# Patient Record
Sex: FEMALE | URBAN - METROPOLITAN AREA
[De-identification: names, ages, dates, MRNs, and addresses within clinical notes are randomized per-mention and may not be internally consistent; named-entity substitution may affect disease eponyms.]

---

## 2020-08-02 ENCOUNTER — EMERGENCY (EMERGENCY)
Facility: HOSPITAL | Age: 20
LOS: 0 days | Discharge: HOME | End: 2020-08-02
Attending: EMERGENCY MEDICINE
Payer: COMMERCIAL

## 2020-08-02 VITALS
RESPIRATION RATE: 16 BRPM | SYSTOLIC BLOOD PRESSURE: 122 MMHG | HEART RATE: 67 BPM | DIASTOLIC BLOOD PRESSURE: 67 MMHG | OXYGEN SATURATION: 100 % | TEMPERATURE: 98 F

## 2020-08-02 VITALS — WEIGHT: 190.92 LBS

## 2020-08-02 DIAGNOSIS — S32.019A UNSPECIFIED FRACTURE OF FIRST LUMBAR VERTEBRA, INITIAL ENCOUNTER FOR CLOSED FRACTURE: ICD-10-CM
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
## 2020-08-02 DIAGNOSIS — M54.5 LOW BACK PAIN: ICD-10-CM

## 2020-08-02 DIAGNOSIS — Z97.5 PRESENCE OF (INTRAUTERINE) CONTRACEPTIVE DEVICE: ICD-10-CM
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
## 2020-08-02 LAB
ABO RH CONFIRMATION: SIGNIFICANT CHANGE UP
ALBUMIN SERPL ELPH-MCNC: 3.9 G/DL — SIGNIFICANT CHANGE UP (ref 3.3–5)
ALP SERPL-CCNC: 69 U/L — SIGNIFICANT CHANGE UP (ref 40–120)
ALT FLD-CCNC: 17 U/L — SIGNIFICANT CHANGE UP (ref 12–78)
ANION GAP SERPL CALC-SCNC: 6 MMOL/L — SIGNIFICANT CHANGE UP (ref 5–17)
APTT BLD: 32.1 SEC — SIGNIFICANT CHANGE UP (ref 27.5–35.5)
AST SERPL-CCNC: 18 U/L — SIGNIFICANT CHANGE UP (ref 15–37)
BASOPHILS # BLD AUTO: 0.03 K/UL — SIGNIFICANT CHANGE UP (ref 0–0.2)
BASOPHILS NFR BLD AUTO: 0.2 % — SIGNIFICANT CHANGE UP (ref 0–2)
BILIRUB SERPL-MCNC: 0.3 MG/DL — SIGNIFICANT CHANGE UP (ref 0.2–1.2)
BUN SERPL-MCNC: 9 MG/DL — SIGNIFICANT CHANGE UP (ref 7–23)
CALCIUM SERPL-MCNC: 8.6 MG/DL — SIGNIFICANT CHANGE UP (ref 8.5–10.1)
CHLORIDE SERPL-SCNC: 110 MMOL/L — HIGH (ref 96–108)
CO2 SERPL-SCNC: 24 MMOL/L — SIGNIFICANT CHANGE UP (ref 22–31)
CREAT SERPL-MCNC: 0.73 MG/DL — SIGNIFICANT CHANGE UP (ref 0.5–1.3)
EOSINOPHIL # BLD AUTO: 0.02 K/UL — SIGNIFICANT CHANGE UP (ref 0–0.5)
EOSINOPHIL NFR BLD AUTO: 0.2 % — SIGNIFICANT CHANGE UP (ref 0–6)
GLUCOSE SERPL-MCNC: 102 MG/DL — HIGH (ref 70–99)
HCG SERPL-ACNC: <1 MIU/ML — SIGNIFICANT CHANGE UP
HCT VFR BLD CALC: 38 % — SIGNIFICANT CHANGE UP (ref 34.5–45)
HGB BLD-MCNC: 12.4 G/DL — SIGNIFICANT CHANGE UP (ref 11.5–15.5)
IMM GRANULOCYTES NFR BLD AUTO: 0.9 % — SIGNIFICANT CHANGE UP (ref 0–1.5)
INR BLD: 1.07 RATIO — SIGNIFICANT CHANGE UP (ref 0.88–1.16)
LYMPHOCYTES # BLD AUTO: 1.29 K/UL — SIGNIFICANT CHANGE UP (ref 1–3.3)
LYMPHOCYTES # BLD AUTO: 9.9 % — LOW (ref 13–44)
MCHC RBC-ENTMCNC: 27.3 PG — SIGNIFICANT CHANGE UP (ref 27–34)
MCHC RBC-ENTMCNC: 32.6 GM/DL — SIGNIFICANT CHANGE UP (ref 32–36)
MCV RBC AUTO: 83.7 FL — SIGNIFICANT CHANGE UP (ref 80–100)
MONOCYTES # BLD AUTO: 0.53 K/UL — SIGNIFICANT CHANGE UP (ref 0–0.9)
MONOCYTES NFR BLD AUTO: 4.1 % — SIGNIFICANT CHANGE UP (ref 2–14)
NEUTROPHILS # BLD AUTO: 11.04 K/UL — HIGH (ref 1.8–7.4)
NEUTROPHILS NFR BLD AUTO: 84.7 % — HIGH (ref 43–77)
PLATELET # BLD AUTO: 358 K/UL — SIGNIFICANT CHANGE UP (ref 150–400)
POTASSIUM SERPL-MCNC: 3.9 MMOL/L — SIGNIFICANT CHANGE UP (ref 3.5–5.3)
POTASSIUM SERPL-SCNC: 3.9 MMOL/L — SIGNIFICANT CHANGE UP (ref 3.5–5.3)
PROT SERPL-MCNC: 7.4 GM/DL — SIGNIFICANT CHANGE UP (ref 6–8.3)
PROTHROM AB SERPL-ACNC: 12.5 SEC — SIGNIFICANT CHANGE UP (ref 10.6–13.6)
RBC # BLD: 4.54 M/UL — SIGNIFICANT CHANGE UP (ref 3.8–5.2)
RBC # FLD: 14.1 % — SIGNIFICANT CHANGE UP (ref 10.3–14.5)
SODIUM SERPL-SCNC: 140 MMOL/L — SIGNIFICANT CHANGE UP (ref 135–145)
WBC # BLD: 13.03 K/UL — HIGH (ref 3.8–10.5)
WBC # FLD AUTO: 13.03 K/UL — HIGH (ref 3.8–10.5)

## 2020-08-02 PROCEDURE — 81025 URINE PREGNANCY TEST: CPT

## 2020-08-02 PROCEDURE — 74177 CT ABD & PELVIS W/CONTRAST: CPT

## 2020-08-02 PROCEDURE — 86850 RBC ANTIBODY SCREEN: CPT

## 2020-08-02 PROCEDURE — 85025 COMPLETE CBC W/AUTO DIFF WBC: CPT

## 2020-08-02 PROCEDURE — 86905 BLOOD TYPING RBC ANTIGENS: CPT

## 2020-08-02 PROCEDURE — 72170 X-RAY EXAM OF PELVIS: CPT

## 2020-08-02 PROCEDURE — 96374 THER/PROPH/DIAG INJ IV PUSH: CPT

## 2020-08-02 PROCEDURE — 86077 PHYS BLOOD BANK SERV XMATCH: CPT

## 2020-08-02 PROCEDURE — 80053 COMPREHEN METABOLIC PANEL: CPT

## 2020-08-02 PROCEDURE — 72220 X-RAY EXAM SACRUM TAILBONE: CPT | Mod: 26

## 2020-08-02 PROCEDURE — 72170 X-RAY EXAM OF PELVIS: CPT | Mod: 26

## 2020-08-02 PROCEDURE — 72220 X-RAY EXAM SACRUM TAILBONE: CPT

## 2020-08-02 PROCEDURE — 99284 EMERGENCY DEPT VISIT MOD MDM: CPT

## 2020-08-02 PROCEDURE — 74177 CT ABD & PELVIS W/CONTRAST: CPT | Mod: 26

## 2020-08-02 PROCEDURE — 99284 EMERGENCY DEPT VISIT MOD MDM: CPT | Mod: 25

## 2020-08-02 PROCEDURE — 86880 COOMBS TEST DIRECT: CPT

## 2020-08-02 PROCEDURE — 85610 PROTHROMBIN TIME: CPT

## 2020-08-02 PROCEDURE — 36415 COLL VENOUS BLD VENIPUNCTURE: CPT

## 2020-08-02 PROCEDURE — 86870 RBC ANTIBODY IDENTIFICATION: CPT

## 2020-08-02 PROCEDURE — 86900 BLOOD TYPING SEROLOGIC ABO: CPT

## 2020-08-02 PROCEDURE — 96361 HYDRATE IV INFUSION ADD-ON: CPT

## 2020-08-02 PROCEDURE — 86901 BLOOD TYPING SEROLOGIC RH(D): CPT

## 2020-08-02 PROCEDURE — 84702 CHORIONIC GONADOTROPIN TEST: CPT

## 2020-08-02 PROCEDURE — 85730 THROMBOPLASTIN TIME PARTIAL: CPT

## 2020-08-02 RX ORDER — SODIUM CHLORIDE 9 MG/ML
1000 INJECTION INTRAMUSCULAR; INTRAVENOUS; SUBCUTANEOUS ONCE
Refills: 0 | Status: COMPLETED | OUTPATIENT
Start: 2020-08-02 | End: 2020-08-02

## 2020-08-02 RX ORDER — OXYCODONE AND ACETAMINOPHEN 5; 325 MG/1; MG/1
1 TABLET ORAL ONCE
Refills: 0 | Status: DISCONTINUED | OUTPATIENT
Start: 2020-08-02 | End: 2020-08-02

## 2020-08-02 RX ORDER — ONDANSETRON 8 MG/1
1 TABLET, FILM COATED ORAL
Qty: 12 | Refills: 0
Start: 2020-08-02

## 2020-08-02 RX ORDER — HYDROMORPHONE HYDROCHLORIDE 2 MG/ML
0.5 INJECTION INTRAMUSCULAR; INTRAVENOUS; SUBCUTANEOUS ONCE
Refills: 0 | Status: DISCONTINUED | OUTPATIENT
Start: 2020-08-02 | End: 2020-08-02

## 2020-08-02 RX ORDER — IBUPROFEN 200 MG
600 TABLET ORAL ONCE
Refills: 0 | Status: COMPLETED | OUTPATIENT
Start: 2020-08-02 | End: 2020-08-02

## 2020-08-02 RX ORDER — ONDANSETRON 8 MG/1
4 TABLET, FILM COATED ORAL ONCE
Refills: 0 | Status: COMPLETED | OUTPATIENT
Start: 2020-08-02 | End: 2020-08-02

## 2020-08-02 RX ADMIN — OXYCODONE AND ACETAMINOPHEN 1 TABLET(S): 5; 325 TABLET ORAL at 16:09

## 2020-08-02 RX ADMIN — ONDANSETRON 4 MILLIGRAM(S): 8 TABLET, FILM COATED ORAL at 12:59

## 2020-08-02 RX ADMIN — SODIUM CHLORIDE 1000 MILLILITER(S): 9 INJECTION INTRAMUSCULAR; INTRAVENOUS; SUBCUTANEOUS at 13:28

## 2020-08-02 RX ADMIN — HYDROMORPHONE HYDROCHLORIDE 0.5 MILLIGRAM(S): 2 INJECTION INTRAMUSCULAR; INTRAVENOUS; SUBCUTANEOUS at 13:28

## 2020-08-02 RX ADMIN — Medication 600 MILLIGRAM(S): at 12:26

## 2020-08-02 RX ADMIN — Medication 600 MILLIGRAM(S): at 12:50

## 2020-08-02 RX ADMIN — SODIUM CHLORIDE 1000 MILLILITER(S): 9 INJECTION INTRAMUSCULAR; INTRAVENOUS; SUBCUTANEOUS at 16:07

## 2020-08-02 NOTE — ED STATDOCS - PATIENT PORTAL LINK FT
You can access the FollowMyHealth Patient Portal offered by [facility] by registering at the following website: [email or web address]. By joining "[employer]’s FollowMyHealth portal, you will also be able to view your health information using other applications (apps) compatible with our system.

## 2020-08-02 NOTE — ED STATDOCS - NSFOLLOWUPINSTRUCTIONS_ED_ALL_ED_FT
.dccompression Spinal Compression Fracture     A spinal compression fracture is a collapse of the bones that form the spine (vertebrae). With this type of fracture, the vertebrae become pushed (compressed) into a wedge shape. Most compression fractures happen in the middle or lower part of the spine.  What are the causes?  This condition may be caused by:  Thinning and loss of density in the bones (osteoporosis). This is the most common cause.A fall.A car or motorcycle accident.Cancer.Trauma, such as a heavy, direct hit to the head or back.What increases the risk?  You are more likely to develop this condition if:  You are 60 years or older.You have osteoporosis.You have certain types of cancer, including:  Multiple myeloma.Lymphoma.Prostate cancer.Lung cancer.Breast cancer.What are the signs or symptoms?  Symptoms of this condition include:  Severe pain.Pain that gets worse over time.Pain that is worse when you stand, walk, sit, or bend.Sudden pain that is so bad that it is hard for you to move.Bending or humping of the spine.Gradual loss of height.Numbness, tingling, or weakness in the back and legs.Trouble walking.Your symptoms will depend on the cause of the fracture and how quickly it develops.  How is this diagnosed?  This condition may be diagnosed based on symptoms, medical history, and a physical exam. During the physical exam, your health care provider may tap along the length of your spine to check for tenderness. Tests may be done to confirm the diagnosis. They may include:  A bone mineral density test to check for osteoporosis.Imaging tests, such as a spine X-ray, CT scan, or MRI.How is this treated?  Treatment for this condition depends on the cause and severity of the condition. Some fractures may heal on their own with supportive care. Treatment may include:  Pain medicine.Rest.A back brace.Physical therapy exercises.Medicine to strengthen bone.Calcium and vitamin D supplements.Fractures that cause the back to become misshapen, cause nerve pain or weakness, or do not respond to other treatment may be treated with surgery. This may include:  Vertebroplasty. Bone cement is injected into the collapsed vertebrae to stabilize them.Balloon kyphoplasty. The collapsed vertebrae are expanded with a balloon and then bone cement is injected into them.Spinal fusion. The collapsed vertebrae are connected (fused) to normal vertebrae.Follow these instructions at home:  Medicines     Take over-the-counter and prescription medicines only as told by your health care provider.Do not drive or operate heavy machinery while taking prescription pain medicine.If you are taking prescription pain medicine, take actions to prevent or treat constipation. Your health care provider may recommend that you:   Drink enough fluid to keep your urine pale yellow. Eat foods that are high in fiber, such as fresh fruits and vegetables, whole grains, and beans. Limit foods that are high in fat and processed sugars, such as fried or sweet foods. Take an over-the-counter or prescription medicine for constipation.If you have a brace:     Wear the brace as told by your health care provider. Remove it only as told by your health care provider. Loosen the brace if your fingers or toes tingle, become numb, or turn cold and blue.Keep the brace clean.If the brace is not waterproof:  Do not let it get wet.Cover it with a watertight covering when you take a bath or a shower.Managing pain, stiffness, and swelling        If directed, apply ice to the injured area:  If you have a removable brace, remove it as told by your health care provider.Put ice in a plastic bag.Place a towel between your skin and the bag.Leave the ice on for 30 minutes every two hours at first. Then apply the ice as needed.Activity     Rest as told by your health care provider.   Avoid sitting for a long time without moving. Get up to take short walks every 1–2 hours. This is important to improve blood flow and breathing. Ask for help if you feel weak or unsteady.Return to your normal activities as directed by your health care provider. Ask what activities are safe for you.Do exercises to improve motion and strength in your back (physical therapy), as recommended by your health care provider.Exercise regularly as directed by your health care provider.General instructions        Do not drink alcohol. Alcohol can interfere with your treatment.Do not use any products that contain nicotine or tobacco, such as cigarettes and e-cigarettes. These can delay bone healing. If you need help quitting, ask your health care provider.Keep all follow-up visits as told by your health care provider. This is important. It can help to prevent permanent injury, disability, and long-lasting (chronic) pain.Contact a health care provider if:  You have a fever.You develop a cough that makes your pain worse.Your pain medicine is not helping.Your pain does not get better over time.You cannot return to your normal activities as planned or expected.Get help right away if:  Your pain is very bad and it suddenly gets worse.You are unable to move any body part (paralysis) that is below the level of your injury.You have numbness, tingling, or weakness in any body part that is below the level of your injury.You cannot control your bladder or bowels.Summary  A spinal compression fracture is a collapse of the bones that form the spine (vertebrae).With this type of fracture, the vertebrae become pushed (compressed) into a wedge shape.Your symptoms and treatment will depend on the cause and severity of the fracture and how quickly it develops.Some fractures may heal on their own with supportive care. Fractures that cause the back to become misshapen, cause nerve pain or weakness, or do not respond to other treatment may be treated with surgery.This information is not intended to replace advice given to you by your health care provider. Make sure you discuss any questions you have with your health care provider.    FOLLOW UP WITH DR LOVE IN 1-2 DAYS. RETURN TO THE ER FOR ANY WORSENING SYMPTOMS OR NEW CONCERNS.   CALL YOUR GYN ABOUT YOUR IUD POSITION TOMORROW.

## 2020-08-02 NOTE — ED STATDOCS - CARE PROVIDER_API CALL
Venkat Corley  NEUROSURGERY  [address] [place] [address]  Phone: [phone]  Fax: [phone]  Follow Up Time:

## 2020-08-02 NOTE — ED STATDOCS - OBJECTIVE STATEMENT
19 YOF PMHx IUD presents to ED s/p fall approximately 45 minutes PTA. Pt was roller blading and fell backwards, says she fell onto her buttocks. Pt had immediate onset of low back pain. Pt had difficulty getting up and driving to ED due to pain. Denies blunt trauma to abd. Denies possibility of pregnancy. 19 YOF PMHx IUD presents to ED s/p fall approximately 45 minutes PTA. Pt was roller blading and fell backwards onto her buttocks. Pt had immediate onset of low back pain, says she had difficulty getting up and driving to ED due to pain. Denies blunt trauma to abd. Denies possibility of pregnancy.

## 2020-08-02 NOTE — ED STATDOCS - PROGRESS NOTE DETAILS
signed Mildred Chacko PA-C Pt seen initially in intake by Dr Noriega.   19F c/o low back pain since she fell backwards landing on her low back/buttock while rollerskating PTA. Pt denies LOC or head injury. Notes her pain is severe and she feels pain "inside" her body. pt tearful, in obvious pain, vomited in ED due to pain. No upper thoracic or chest pain. Mild lower abd/suprapubic TTP, + mild midline lumbar TTP L5 region no crepitus or step offs. No significant findings on xray. Due to level of pain will order labs and CT to better r/o fx as well as internal injury. Pt agrees with plan of  care. signed Mildred Chacko PA-C   No significant findings on labwork. CT shows L1 compression fx and IUD in myometrium. Pt denies LE weakness, numbness, saddle anesthesia or incontinence. no midline spinal tenderness, strength 5/5 bilateral lower extremities including plantar/dorsiflexion of foot and great toe. gross motor/sensation intact, no saddle anesthesia. bilateral DTR 2+ patellar and achilles. bilateral feet warm, 2+ DP pulses. Rectal exam normal tone normal perirectal sensation. Exam chaperoned by JAI omer. Discussed CT findings with pt and her grandmother who was in the room. f/u spine Dr Corley, offered to speak to spine before pt left but they opt to just call office tomorrow. recommend pt call her gyn tomorrow regarding IUD findings. rx percocet and zofran. Pt and grandmother agree with DC and plan of  care.

## 2020-08-02 NOTE — ED STATDOCS - CLINICAL SUMMARY MEDICAL DECISION MAKING FREE TEXT BOX
Will XR sacrum and coccyx for possible fx, less likely displaced. Disposition pending pain control and imaging.

## 2020-08-02 NOTE — ED ADULT TRIAGE NOTE - CHIEF COMPLAINT QUOTE
Pt fell while skating, complains of back pain, denies other injury.  Pt denies fever, cough, sick contacts.

## 2020-08-05 ENCOUNTER — INPATIENT (INPATIENT)
Facility: HOSPITAL | Age: 20
LOS: 1 days | Discharge: HOME | DRG: 552 | End: 2020-08-07
Attending: SPECIALIST | Admitting: SPECIALIST
Payer: COMMERCIAL

## 2020-08-05 ENCOUNTER — APPOINTMENT (OUTPATIENT)
Dept: NEUROSURGERY | Facility: CLINIC | Age: 20
End: 2020-08-05
Payer: COMMERCIAL

## 2020-08-05 VITALS — WEIGHT: 91.93 LBS

## 2020-08-05 DIAGNOSIS — Z97.5 PRESENCE OF (INTRAUTERINE) CONTRACEPTIVE DEVICE: ICD-10-CM

## 2020-08-05 DIAGNOSIS — M54.5 LOW BACK PAIN: ICD-10-CM

## 2020-08-05 DIAGNOSIS — S32.011D: ICD-10-CM

## 2020-08-05 DIAGNOSIS — S32.009A UNSPECIFIED FRACTURE OF UNSPECIFIED LUMBAR VERTEBRA, INITIAL ENCOUNTER FOR CLOSED FRACTURE: ICD-10-CM

## 2020-08-05 LAB
ALBUMIN SERPL ELPH-MCNC: 3.6 G/DL — SIGNIFICANT CHANGE UP (ref 3.3–5)
ALP SERPL-CCNC: 61 U/L — SIGNIFICANT CHANGE UP (ref 40–120)
ALT FLD-CCNC: 14 U/L — SIGNIFICANT CHANGE UP (ref 12–78)
ANION GAP SERPL CALC-SCNC: 6 MMOL/L — SIGNIFICANT CHANGE UP (ref 5–17)
APPEARANCE UR: CLEAR — SIGNIFICANT CHANGE UP
AST SERPL-CCNC: 12 U/L — LOW (ref 15–37)
BASOPHILS # BLD AUTO: 0.02 K/UL — SIGNIFICANT CHANGE UP (ref 0–0.2)
BASOPHILS NFR BLD AUTO: 0.2 % — SIGNIFICANT CHANGE UP (ref 0–2)
BILIRUB SERPL-MCNC: 0.3 MG/DL — SIGNIFICANT CHANGE UP (ref 0.2–1.2)
BILIRUB UR-MCNC: NEGATIVE — SIGNIFICANT CHANGE UP
BUN SERPL-MCNC: 7 MG/DL — SIGNIFICANT CHANGE UP (ref 7–23)
CALCIUM SERPL-MCNC: 8.8 MG/DL — SIGNIFICANT CHANGE UP (ref 8.5–10.1)
CHLORIDE SERPL-SCNC: 106 MMOL/L — SIGNIFICANT CHANGE UP (ref 96–108)
CO2 SERPL-SCNC: 27 MMOL/L — SIGNIFICANT CHANGE UP (ref 22–31)
COLOR SPEC: YELLOW — SIGNIFICANT CHANGE UP
CREAT SERPL-MCNC: 0.8 MG/DL — SIGNIFICANT CHANGE UP (ref 0.5–1.3)
DIFF PNL FLD: NEGATIVE — SIGNIFICANT CHANGE UP
EOSINOPHIL # BLD AUTO: 0.11 K/UL — SIGNIFICANT CHANGE UP (ref 0–0.5)
EOSINOPHIL NFR BLD AUTO: 1.3 % — SIGNIFICANT CHANGE UP (ref 0–6)
GLUCOSE SERPL-MCNC: 79 MG/DL — SIGNIFICANT CHANGE UP (ref 70–99)
GLUCOSE UR QL: NEGATIVE MG/DL — SIGNIFICANT CHANGE UP
HCT VFR BLD CALC: 37.2 % — SIGNIFICANT CHANGE UP (ref 34.5–45)
HGB BLD-MCNC: 11.8 G/DL — SIGNIFICANT CHANGE UP (ref 11.5–15.5)
IMM GRANULOCYTES NFR BLD AUTO: 0.2 % — SIGNIFICANT CHANGE UP (ref 0–1.5)
KETONES UR-MCNC: NEGATIVE — SIGNIFICANT CHANGE UP
LEUKOCYTE ESTERASE UR-ACNC: ABNORMAL
LYMPHOCYTES # BLD AUTO: 2.52 K/UL — SIGNIFICANT CHANGE UP (ref 1–3.3)
LYMPHOCYTES # BLD AUTO: 30.7 % — SIGNIFICANT CHANGE UP (ref 13–44)
MCHC RBC-ENTMCNC: 26.6 PG — LOW (ref 27–34)
MCHC RBC-ENTMCNC: 31.7 GM/DL — LOW (ref 32–36)
MCV RBC AUTO: 84 FL — SIGNIFICANT CHANGE UP (ref 80–100)
MONOCYTES # BLD AUTO: 0.41 K/UL — SIGNIFICANT CHANGE UP (ref 0–0.9)
MONOCYTES NFR BLD AUTO: 5 % — SIGNIFICANT CHANGE UP (ref 2–14)
NEUTROPHILS # BLD AUTO: 5.13 K/UL — SIGNIFICANT CHANGE UP (ref 1.8–7.4)
NEUTROPHILS NFR BLD AUTO: 62.6 % — SIGNIFICANT CHANGE UP (ref 43–77)
NITRITE UR-MCNC: NEGATIVE — SIGNIFICANT CHANGE UP
PH UR: 6 — SIGNIFICANT CHANGE UP (ref 5–8)
PLATELET # BLD AUTO: 354 K/UL — SIGNIFICANT CHANGE UP (ref 150–400)
POTASSIUM SERPL-MCNC: 3.8 MMOL/L — SIGNIFICANT CHANGE UP (ref 3.5–5.3)
POTASSIUM SERPL-SCNC: 3.8 MMOL/L — SIGNIFICANT CHANGE UP (ref 3.5–5.3)
PROT SERPL-MCNC: 7.3 GM/DL — SIGNIFICANT CHANGE UP (ref 6–8.3)
PROT UR-MCNC: NEGATIVE MG/DL — SIGNIFICANT CHANGE UP
RBC # BLD: 4.43 M/UL — SIGNIFICANT CHANGE UP (ref 3.8–5.2)
RBC # FLD: 13.6 % — SIGNIFICANT CHANGE UP (ref 10.3–14.5)
SODIUM SERPL-SCNC: 139 MMOL/L — SIGNIFICANT CHANGE UP (ref 135–145)
SP GR SPEC: 1 — LOW (ref 1.01–1.02)
UROBILINOGEN FLD QL: NEGATIVE MG/DL — SIGNIFICANT CHANGE UP
WBC # BLD: 8.21 K/UL — SIGNIFICANT CHANGE UP (ref 3.8–10.5)
WBC # FLD AUTO: 8.21 K/UL — SIGNIFICANT CHANGE UP (ref 3.8–10.5)

## 2020-08-05 PROCEDURE — 72148 MRI LUMBAR SPINE W/O DYE: CPT

## 2020-08-05 PROCEDURE — 93005 ELECTROCARDIOGRAM TRACING: CPT

## 2020-08-05 PROCEDURE — 97116 GAIT TRAINING THERAPY: CPT | Mod: GP

## 2020-08-05 PROCEDURE — 72148 MRI LUMBAR SPINE W/O DYE: CPT | Mod: 26

## 2020-08-05 PROCEDURE — 99253 IP/OBS CNSLTJ NEW/EST LOW 45: CPT

## 2020-08-05 PROCEDURE — 80053 COMPREHEN METABOLIC PANEL: CPT

## 2020-08-05 PROCEDURE — 36415 COLL VENOUS BLD VENIPUNCTURE: CPT

## 2020-08-05 PROCEDURE — 72131 CT LUMBAR SPINE W/O DYE: CPT

## 2020-08-05 PROCEDURE — 99203 OFFICE O/P NEW LOW 30 MIN: CPT

## 2020-08-05 PROCEDURE — 93010 ELECTROCARDIOGRAM REPORT: CPT

## 2020-08-05 PROCEDURE — 85025 COMPLETE CBC W/AUTO DIFF WBC: CPT

## 2020-08-05 PROCEDURE — 72131 CT LUMBAR SPINE W/O DYE: CPT | Mod: 26

## 2020-08-05 PROCEDURE — 97162 PT EVAL MOD COMPLEX 30 MIN: CPT | Mod: GP

## 2020-08-05 RX ORDER — ONDANSETRON 8 MG/1
4 TABLET, FILM COATED ORAL ONCE
Refills: 0 | Status: COMPLETED | OUTPATIENT
Start: 2020-08-05 | End: 2020-08-05

## 2020-08-05 RX ORDER — ACETAMINOPHEN 500 MG
650 TABLET ORAL EVERY 4 HOURS
Refills: 0 | Status: DISCONTINUED | OUTPATIENT
Start: 2020-08-05 | End: 2020-08-07

## 2020-08-05 RX ORDER — ONDANSETRON 8 MG/1
4 TABLET, FILM COATED ORAL EVERY 6 HOURS
Refills: 0 | Status: DISCONTINUED | OUTPATIENT
Start: 2020-08-05 | End: 2020-08-07

## 2020-08-05 RX ORDER — HYDROMORPHONE HYDROCHLORIDE 2 MG/ML
1 INJECTION INTRAMUSCULAR; INTRAVENOUS; SUBCUTANEOUS EVERY 6 HOURS
Refills: 0 | Status: DISCONTINUED | OUTPATIENT
Start: 2020-08-05 | End: 2020-08-06

## 2020-08-05 RX ORDER — SODIUM CHLORIDE 9 MG/ML
1000 INJECTION INTRAMUSCULAR; INTRAVENOUS; SUBCUTANEOUS
Refills: 0 | Status: DISCONTINUED | OUTPATIENT
Start: 2020-08-05 | End: 2020-08-07

## 2020-08-05 RX ORDER — ENOXAPARIN SODIUM 100 MG/ML
40 INJECTION SUBCUTANEOUS DAILY
Refills: 0 | Status: DISCONTINUED | OUTPATIENT
Start: 2020-08-06 | End: 2020-08-07

## 2020-08-05 RX ORDER — MORPHINE SULFATE 50 MG/1
4 CAPSULE, EXTENDED RELEASE ORAL ONCE
Refills: 0 | Status: DISCONTINUED | OUTPATIENT
Start: 2020-08-05 | End: 2020-08-05

## 2020-08-05 RX ORDER — FAMOTIDINE 10 MG/ML
20 INJECTION INTRAVENOUS EVERY 12 HOURS
Refills: 0 | Status: DISCONTINUED | OUTPATIENT
Start: 2020-08-05 | End: 2020-08-07

## 2020-08-05 RX ORDER — SENNA PLUS 8.6 MG/1
2 TABLET ORAL AT BEDTIME
Refills: 0 | Status: DISCONTINUED | OUTPATIENT
Start: 2020-08-05 | End: 2020-08-07

## 2020-08-05 RX ADMIN — SODIUM CHLORIDE 75 MILLILITER(S): 9 INJECTION INTRAMUSCULAR; INTRAVENOUS; SUBCUTANEOUS at 21:02

## 2020-08-05 RX ADMIN — MORPHINE SULFATE 4 MILLIGRAM(S): 50 CAPSULE, EXTENDED RELEASE ORAL at 20:49

## 2020-08-05 RX ADMIN — HYDROMORPHONE HYDROCHLORIDE 1 MILLIGRAM(S): 2 INJECTION INTRAMUSCULAR; INTRAVENOUS; SUBCUTANEOUS at 23:30

## 2020-08-05 RX ADMIN — SODIUM CHLORIDE 75 MILLILITER(S): 9 INJECTION INTRAMUSCULAR; INTRAVENOUS; SUBCUTANEOUS at 23:51

## 2020-08-05 RX ADMIN — HYDROMORPHONE HYDROCHLORIDE 1 MILLIGRAM(S): 2 INJECTION INTRAMUSCULAR; INTRAVENOUS; SUBCUTANEOUS at 23:50

## 2020-08-05 NOTE — ED ADULT NURSE REASSESSMENT NOTE - NS ED NURSE REASSESS COMMENT FT1
aware of pt's pain at this time and at bedside for pt evaluation, awaiting further orders at this time, will continue to monitor.

## 2020-08-05 NOTE — CHART NOTE - NSCHARTNOTEFT_GEN_A_CORE
L1 fracture:     Patient has an estimated Caprini Score of greater than 5.  However, the patient's unique clinical situation will be addressed in an individual manner to determine appropriate anticoagulation treatment, if any.  At this time both chemical DVT prophylaxis & Mechancial DVT prophylaxis is permitted.

## 2020-08-05 NOTE — H&P ADULT - NSHPLABSRESULTS_GEN_ALL_CORE
Labs:  Pending    Radiology report:  - CT a/p:  IMPRESSION:  Acute compression fracture of the L1 vertebral body. The left arm of an intrauterine device appears large within the left lateral wall of the myometrium.    - CT Lspine: pending at this time

## 2020-08-05 NOTE — ED PROVIDER NOTE - ENMT, MLM
Airway patent, Nasal mucosa clear. Mouth with normal mucosa. Throat has no vesicles, no oropharyngeal exudates and uvula is midline. +spinal support in place, pt is guarding spine.

## 2020-08-05 NOTE — ED ADULT TRIAGE NOTE - CHIEF COMPLAINT QUOTE
pt states she fell backwards on Sunday, when rollerskating and fractured her L1  vertebrae. pt was seen iN ED and discharged, sent back to ED today by Dr harry. pt c/o back pain that is less  severe than original visit on sunday. pt denies numbness tingling, SOb dizziness weakness, new injury since original injury, fever.

## 2020-08-05 NOTE — ED PROVIDER NOTE - PROGRESS NOTE DETAILS
discussed with Dr Corley--he is concerned that her fracture may be unstable and recommends admission at this time.  discussed results with pt and family at bedside at length.  will admit to Dr Corley.  MD Octavia

## 2020-08-05 NOTE — H&P ADULT - PROBLEM SELECTOR PLAN 2
- Admit to Dr. Corley  - Pain meds IV tylenol, IV dilaudid, oxy IR prn  - Bedrest except for oob to bath ok with assist  - LSO corsette brace on patient at this time, will order TLSO custom in am 8/6/20  - Pt for CT Lspine now in ED thin cuts through L1 and MRI of Lspine 8/6/20  - full DVT ppx both chemical and mechanical  - Pt voiding without difficulty, groin sensate intact  - d/w Dr. Corley all care plan directives for this patient during this hospital admission.

## 2020-08-05 NOTE — H&P ADULT - NSHPPHYSICALEXAM_GEN_ALL_CORE
Vital Signs Last 24 Hrs  T(C): 36.9 (05 Aug 2020 19:25), Max: 36.9 (05 Aug 2020 19:25)  T(F): 98.5 (05 Aug 2020 19:25), Max: 98.5 (05 Aug 2020 19:25)  HR: 72 (05 Aug 2020 19:25) (72 - 72)  BP: 115/71 (05 Aug 2020 19:25) (115/71 - 115/71)  BP(mean): 80 (05 Aug 2020 19:25) (80 - 80)  RR: 18 (05 Aug 2020 19:25) (18 - 18)  SpO2: 100% (05 Aug 2020 19:25) (100% - 100%)    Physical Exam:  Constitutional: Awake / alert  HEENT: PERRLA, EOMI  Neck: Supple  Respiratory: Breath sounds are clear bilaterally  Cardiovascular: S1 and S2, regular rhythm  Gastrointestinal: Soft, NT/ND  Extremities:  no edema  Vascular: No carotid Bruit  Musculoskeletal: back + pain at L1 region area as expected.  Skin: No rashes    Neurological Exam:  HF: A x O x 3, appropriately interactive, normal affect, speech fluent, no aphasia or paraphasic errors. Naming /repetition intact   CN: PERRL, EOMI, VFF, facial sensation normal, no NLFD, tongue midline  Motor: No pronator drift, Strength 5/5 in all 4 ext, normal bulk and tone, no tremor, rigidity or bradykinesia  Sens: Intact to light touch  Reflexes: Symmetric and normal, downgoing toes b/l  Coord:  No FNFA, dysmetria, JAHAIRA intact   Gait/Balance: Cannot test

## 2020-08-05 NOTE — ED ADULT NURSE NOTE - OBJECTIVE STATEMENT
Pt presents to er with complaints of lower back pain radiating down her RLE, pt states she was discharged home a couple of nights ago and when she followed up with orthopedic she was told to come back to Ed this evening. Pt A&OX4, denies upper/lower extremity numbness/tingling, hand, plantar/dorsal flexion strength 5/5, pt denies difficulty with bowel/urinary pattern at this time, pt interviewed with Yanni Rasmussen R.N as a second R.N at this time. Pt observed sitting up comfortably in stretcher at this time, safety maintained, will continue to monitor.

## 2020-08-05 NOTE — ED PROVIDER NOTE - OBJECTIVE STATEMENT
18 y/o female with PMHx of IUD in place presents to the ED sent in by Dr. Corley c/o back pain post fall. Pt states that she fell backwards on 8/2 while roller-skating, and fractured L1 vertebrae. Pt was seen in ED on 8/2 and then discharged. Dr Corley then recommended pt to be readmitted to [facility] ED. Pt takes Percocet for pain management, BID, with no improvement. Denies fevers. NKDA. PCP: Barney

## 2020-08-05 NOTE — H&P ADULT - ASSESSMENT
19 yoF PMHx IUD presents to ED s/p fall approximately 45 minutes PTA. Pt was roller blading and fell backwards onto her buttocks. Pt had immediate onset of low back pain, says she had difficulty getting up and driving to ED due to pain. Denies blunt trauma to abd. Denies possibility of pregnancy.  Pt with severe pain, + vomitting at time of incident.  Pt with L1 fracture (2 column injury).  Pt on bedrest, ok with assist to the bathroom.  Pt needs CT Lspine now and MRI of Lspine tommorrow 8/6/20.  Pt returns to ED for further evaluation / management / potential treatment.  Ok to admit to Dr. Corley.

## 2020-08-05 NOTE — ED ADULT NURSE NOTE - NSIMPLEMENTINTERV_GEN_ALL_ED
Implemented All Fall with Harm Risk Interventions:  [place] to call system. Call bell, personal items and telephone within reach. Instruct patient to call for assistance. Room bathroom lighting operational. Non-slip footwear when patient is off stretcher. Physically safe environment: no spills, clutter or unnecessary equipment. Stretcher in lowest position, wheels locked, appropriate side rails in place. Provide visual cue, wrist band, yellow gown, etc. Monitor gait and stability. Monitor for mental status changes and reorient to person, place, and time. Review medications for side effects contributing to fall risk. Reinforce activity limits and safety measures with patient and family. Provide visual clues: red socks.

## 2020-08-05 NOTE — H&P ADULT - HISTORY OF PRESENT ILLNESS
Neurosurgery:    19 yoF PMHx IUD presents to ED s/p fall approximately 45 minutes PTA. Pt was roller blading and fell backwards onto her buttocks. Pt had immediate onset of low back pain, says she had difficulty getting up and driving to ED due to pain. Denies blunt trauma to abd. Denies possibility of pregnancy.  Pt with severe pain, + vomitting at time of incident.  Pt with L1 fracture (2 column injury).  Pt on bedrest, ok with assist to the bathroom.  Pt needs CT Lspine now and MRI of Lspine tommorrow 8/6/20.  Pt returns to ED for further evaluation / management / potential treatment.  Ok to admit to Dr. Corley.    Pt needs TLSO clamb shell / hard plastic bracing solution.    REVIEW OF SYSTEMS:   · CONSTITUTIONAL: no fever and no chills.	  · CARDIOVASCULAR: no chest pain and no edema.	  · RESPIRATORY: no chest pain, no cough, and no shortness of breath.	  · MUSCULOSKELETAL: - - -	  · Musculoskeletal [+]: BACK PAIN	  · ROS STATEMENT: all other ROS negative except as per HPI	    PAST MEDICAL & SURGICAL HISTORY:  IUD (intrauterine device) in place    FAMILY HISTORY:  Non/contrib    Social Hx:    Active  Student  No reports of Tob/ Social ETOH  Allergies  No Known Allergies

## 2020-08-06 PROBLEM — Z97.5 PRESENCE OF (INTRAUTERINE) CONTRACEPTIVE DEVICE: Chronic | Status: ACTIVE | Noted: 2020-08-03

## 2020-08-06 LAB
ALBUMIN SERPL ELPH-MCNC: 3.4 G/DL — SIGNIFICANT CHANGE UP (ref 3.3–5)
ALP SERPL-CCNC: 60 U/L — SIGNIFICANT CHANGE UP (ref 40–120)
ALT FLD-CCNC: 14 U/L — SIGNIFICANT CHANGE UP (ref 12–78)
ANION GAP SERPL CALC-SCNC: 6 MMOL/L — SIGNIFICANT CHANGE UP (ref 5–17)
AST SERPL-CCNC: 14 U/L — LOW (ref 15–37)
BASOPHILS # BLD AUTO: 0.02 K/UL — SIGNIFICANT CHANGE UP (ref 0–0.2)
BASOPHILS NFR BLD AUTO: 0.3 % — SIGNIFICANT CHANGE UP (ref 0–2)
BILIRUB SERPL-MCNC: 0.5 MG/DL — SIGNIFICANT CHANGE UP (ref 0.2–1.2)
BUN SERPL-MCNC: 6 MG/DL — LOW (ref 7–23)
CALCIUM SERPL-MCNC: 8.4 MG/DL — LOW (ref 8.5–10.1)
CHLORIDE SERPL-SCNC: 106 MMOL/L — SIGNIFICANT CHANGE UP (ref 96–108)
CO2 SERPL-SCNC: 27 MMOL/L — SIGNIFICANT CHANGE UP (ref 22–31)
CREAT SERPL-MCNC: 0.63 MG/DL — SIGNIFICANT CHANGE UP (ref 0.5–1.3)
EOSINOPHIL # BLD AUTO: 0.08 K/UL — SIGNIFICANT CHANGE UP (ref 0–0.5)
EOSINOPHIL NFR BLD AUTO: 1.2 % — SIGNIFICANT CHANGE UP (ref 0–6)
GLUCOSE SERPL-MCNC: 86 MG/DL — SIGNIFICANT CHANGE UP (ref 70–99)
HCT VFR BLD CALC: 38.4 % — SIGNIFICANT CHANGE UP (ref 34.5–45)
HGB BLD-MCNC: 12 G/DL — SIGNIFICANT CHANGE UP (ref 11.5–15.5)
IMM GRANULOCYTES NFR BLD AUTO: 0.3 % — SIGNIFICANT CHANGE UP (ref 0–1.5)
LYMPHOCYTES # BLD AUTO: 1.89 K/UL — SIGNIFICANT CHANGE UP (ref 1–3.3)
LYMPHOCYTES # BLD AUTO: 27.7 % — SIGNIFICANT CHANGE UP (ref 13–44)
MCHC RBC-ENTMCNC: 26.8 PG — LOW (ref 27–34)
MCHC RBC-ENTMCNC: 31.3 GM/DL — LOW (ref 32–36)
MCV RBC AUTO: 85.9 FL — SIGNIFICANT CHANGE UP (ref 80–100)
MONOCYTES # BLD AUTO: 0.39 K/UL — SIGNIFICANT CHANGE UP (ref 0–0.9)
MONOCYTES NFR BLD AUTO: 5.7 % — SIGNIFICANT CHANGE UP (ref 2–14)
NEUTROPHILS # BLD AUTO: 4.42 K/UL — SIGNIFICANT CHANGE UP (ref 1.8–7.4)
NEUTROPHILS NFR BLD AUTO: 64.8 % — SIGNIFICANT CHANGE UP (ref 43–77)
PLATELET # BLD AUTO: 321 K/UL — SIGNIFICANT CHANGE UP (ref 150–400)
POTASSIUM SERPL-MCNC: 3.6 MMOL/L — SIGNIFICANT CHANGE UP (ref 3.5–5.3)
POTASSIUM SERPL-SCNC: 3.6 MMOL/L — SIGNIFICANT CHANGE UP (ref 3.5–5.3)
PROT SERPL-MCNC: 7 GM/DL — SIGNIFICANT CHANGE UP (ref 6–8.3)
RBC # BLD: 4.47 M/UL — SIGNIFICANT CHANGE UP (ref 3.8–5.2)
RBC # FLD: 13.5 % — SIGNIFICANT CHANGE UP (ref 10.3–14.5)
SARS-COV-2 IGG SERPL QL IA: NEGATIVE — SIGNIFICANT CHANGE UP
SARS-COV-2 IGM SERPL IA-ACNC: 0.1 INDEX — SIGNIFICANT CHANGE UP
SARS-COV-2 RNA SPEC QL NAA+PROBE: SIGNIFICANT CHANGE UP
SODIUM SERPL-SCNC: 139 MMOL/L — SIGNIFICANT CHANGE UP (ref 135–145)
WBC # BLD: 6.82 K/UL — SIGNIFICANT CHANGE UP (ref 3.8–10.5)
WBC # FLD AUTO: 6.82 K/UL — SIGNIFICANT CHANGE UP (ref 3.8–10.5)

## 2020-08-06 PROCEDURE — 99232 SBSQ HOSP IP/OBS MODERATE 35: CPT

## 2020-08-06 RX ORDER — HYDROMORPHONE HYDROCHLORIDE 2 MG/ML
0.5 INJECTION INTRAMUSCULAR; INTRAVENOUS; SUBCUTANEOUS EVERY 4 HOURS
Refills: 0 | Status: DISCONTINUED | OUTPATIENT
Start: 2020-08-06 | End: 2020-08-07

## 2020-08-06 RX ORDER — POLYETHYLENE GLYCOL 3350 17 G/17G
17 POWDER, FOR SOLUTION ORAL
Refills: 0 | Status: DISCONTINUED | OUTPATIENT
Start: 2020-08-06 | End: 2020-08-07

## 2020-08-06 RX ORDER — KETOROLAC TROMETHAMINE 30 MG/ML
15 SYRINGE (ML) INJECTION EVERY 6 HOURS
Refills: 0 | Status: DISCONTINUED | OUTPATIENT
Start: 2020-08-06 | End: 2020-08-07

## 2020-08-06 RX ADMIN — HYDROMORPHONE HYDROCHLORIDE 1 MILLIGRAM(S): 2 INJECTION INTRAMUSCULAR; INTRAVENOUS; SUBCUTANEOUS at 08:36

## 2020-08-06 RX ADMIN — Medication 15 MILLIGRAM(S): at 15:49

## 2020-08-06 RX ADMIN — HYDROMORPHONE HYDROCHLORIDE 0.5 MILLIGRAM(S): 2 INJECTION INTRAMUSCULAR; INTRAVENOUS; SUBCUTANEOUS at 15:59

## 2020-08-06 RX ADMIN — SODIUM CHLORIDE 75 MILLILITER(S): 9 INJECTION INTRAMUSCULAR; INTRAVENOUS; SUBCUTANEOUS at 15:59

## 2020-08-06 RX ADMIN — ENOXAPARIN SODIUM 40 MILLIGRAM(S): 100 INJECTION SUBCUTANEOUS at 10:07

## 2020-08-06 RX ADMIN — HYDROMORPHONE HYDROCHLORIDE 1 MILLIGRAM(S): 2 INJECTION INTRAMUSCULAR; INTRAVENOUS; SUBCUTANEOUS at 08:22

## 2020-08-06 RX ADMIN — Medication 15 MILLIGRAM(S): at 16:06

## 2020-08-06 RX ADMIN — Medication 15 MILLIGRAM(S): at 21:51

## 2020-08-06 RX ADMIN — Medication 15 MILLIGRAM(S): at 22:15

## 2020-08-06 RX ADMIN — HYDROMORPHONE HYDROCHLORIDE 0.5 MILLIGRAM(S): 2 INJECTION INTRAMUSCULAR; INTRAVENOUS; SUBCUTANEOUS at 16:14

## 2020-08-06 RX ADMIN — HYDROMORPHONE HYDROCHLORIDE 0.5 MILLIGRAM(S): 2 INJECTION INTRAMUSCULAR; INTRAVENOUS; SUBCUTANEOUS at 20:30

## 2020-08-06 RX ADMIN — HYDROMORPHONE HYDROCHLORIDE 0.5 MILLIGRAM(S): 2 INJECTION INTRAMUSCULAR; INTRAVENOUS; SUBCUTANEOUS at 20:45

## 2020-08-06 NOTE — PROGRESS NOTE ADULT - SUBJECTIVE AND OBJECTIVE BOX
HPI: 19 yoF PMHx IUD presents to ED s/p fall approximately 45 minutes PTA. Pt was roller blading and fell backwards onto her buttocks. Pt had immediate onset of low back pain, says she had difficulty getting up and driving to ED due to pain. Denies blunt trauma to abd. Denies possibility of pregnancy.  Pt with severe pain, + vomitting at time of incident.  Pt with L1 fracture (2 column injury).  Pt on bedrest, ok with assist to the bathroom.  Pt needs CT Lspine now and MRI of Lspine tommorrow 8/6/20.  Pt returns to ED for further evaluation / management / potential treatment.  Ok to admit to Dr. Corley. Pt needs TLSO clamshell / hard plastic bracing solution.    8/6- Pt seen and examined, resting comfortably in bed, pain better controlled but does have nausea from the pain meds, CT/MRI reviewed, awaiting custom clamshell brace from "[employer] explained to pt she should wear a brace at all time if she is over 30 degrees, bedrest with bathroom privileges. Denies difficulty with urination, no numbness down her legs, strength intact.  Pain is in lower back sharp/stabbing in nature, non-radiating, improves with bedrest and pain meds.     Vital Signs Last 24 Hrs  T(C): 36.8 (05 Aug 2020 23:13), Max: 36.9 (05 Aug 2020 19:25)  T(F): 98.3 (05 Aug 2020 23:13), Max: 98.5 (05 Aug 2020 19:25)  HR: 67 (05 Aug 2020 23:13) (66 - 72)  BP: 109/62 (05 Aug 2020 23:13) (109/62 - 115/71)  BP(mean): 80 (05 Aug 2020 19:25) (80 - 80)  RR: 17 (05 Aug 2020 23:13) (17 - 18)  SpO2: 98% (05 Aug 2020 23:13) (98% - 100%)    MEDICATIONS  (STANDING):  enoxaparin Injectable 40 milliGRAM(s) SubCutaneous daily  famotidine    Tablet 20 milliGRAM(s) Oral every 12 hours  multivitamin 1 Tablet(s) Oral daily  sodium chloride 0.9%. 1000 milliLiter(s) (75 mL/Hr) IV Continuous     MEDICATIONS  (PRN):  acetaminophen   Tablet .. 650 milliGRAM(s) Oral every 4 hours PRN Temp greater or equal to 38C (100.4F), Mild Pain (1 - 3)  HYDROmorphone  Injectable 1 milliGRAM(s) IV Push every 6 hours PRN Severe Pain (7 - 10)  ondansetron Injectable 4 milliGRAM(s) IV Push every 6 hours PRN Nausea and/or Vomiting  polyethylene glycol 3350 17 Gram(s) Oral two times a day PRN Constipation  senna 2 Tablet(s) Oral at bedtime PRN Constipation    ROS: pertinent positives in HPI, all other ROS were reviewed and are negative     PHYSICAL EXAM:  Constitutional: Awake / alert, NAD   HEENT: PERRLA, EOMI  Neck: Supple  Respiratory: Breath sounds are clear bilaterally  Cardiovascular: S1 and S2, regular rhythm  Gastrointestinal: Soft, NT/ND  Extremities:  no edema  Vascular: No carotid Bruit  Musculoskeletal: back + pain at L1 region area as expected.  Skin: No rashes    Neurological Exam:  HF: A x O x 3, appropriately interactive, normal affect, speech fluent, no aphasia or paraphasic errors. Naming /repetition intact   CN: PERRL, EOMI, VFF, facial sensation normal, no NLFD, tongue midline  Motor: No pronator drift, Strength 5/5 in all 4 ext, normal bulk and tone, no tremor, rigidity or bradykinesia  Sens: Intact to light touch  Reflexes: Symmetric and normal, downgoing toes b/l  Coord:  No FNFA, dysmetria, JAHAIRA intact   Gait/Balance: Cannot test, bed rest     LABS:                         12.0   6.82  )-----------( 321      ( 06 Aug 2020 08:18 )             38.4     08-06    139  |  106  |  6<L>  ----------------------------<  86  3.6   |  27  |  0.63    Ca    8.4<L>      06 Aug 2020 08:18    TPro  7.0  /  Alb  3.4  /  TBili  0.5  /  DBili  x   /  AST  14<L>  /  ALT  14  /  AlkPhos  60  08-06    LIVER FUNCTIONS - ( 06 Aug 2020 08:18 )  Alb: 3.4 g/dL / Pro: 7.0 gm/dL / ALK PHOS: 60 U/L / ALT: 14 U/L / AST: 14 U/L / GGT: x           RADIOLOGY:  < from: MR Lumbar Spine No Cont (08.05.20 @ 21:51) >  The lumbosacral alignment remains intact. Acute burst type compression fracture of L1 is redemonstrated with mild retropulsion of bone into the epidural space. There is edema at suspected surrounding the fracture site within the vertebral body as manifested by T2 hyperintense T1 hypointense signal given the acuity. The remaining lumbar vertebral body heights are maintained. No additional abnormal marrow signal intensity to suggest edema from occult fracture.    There is no evidence of epidural hematoma. Mildly prominent epidural fat is noted however from T12/L1 through L2-L3. Mild paravertebral soft tissue swelling and L1.    There is disruption of the anterior and posterior longitudinal ligaments at L1. No significant retrolisthesis. The ligamentum flavum is intact.    Retropulsion of bone and prominent epidural fat at the L1 level actually contributes to mild to moderate canal stenosis. There is indentation on the ventral thecal sac without cauda equina compression. The conus medullaris terminates at the T12/L1 level and appears unremarkable. The nerve roots of the cauda equina are normal in signal intensity and morphology. Remaining levels are without significant disc herniation or canal stenosis.    The visualized intra-abdominal and pelvic compartments demonstrate no acute abnormality.    Paraspinal musculature is unremarkable.    IMPRESSION:  Acute burst type compression fracture L1 without significant change from 8/2/2020. 2 column ligamentous disruption.  Mild to moderate canal stenosis at L1. No cauda equina compression.    < from: CT Lumbar Spine No Cont (08.05.20 @ 21:44) >  FINDINGS:  The lumbosacral alignment remains intact. Stable appearance to burst type compression fracture deformity of L1 with mild retropulsion of bone into the epidural space indenting on the ventral thecal sac contributing to minimal canal stenosis. The degree of retropulsion is unchanged. No new lumbar vertebral fracture is seen. The remaining lumbar vertebral body heights are maintained.  No evidence of epidural hematoma on this modality. Slight stranding in the paraspinal fat at L1 is likely posttraumatic. No additional paraspinal inflammatory change or fluid.  Evaluation of the individual levels demonstrates no significant disc herniation or canal stenosis below L1 and L2.  The visualized intra-abdominal and pelvic compartments demonstrate no acute abnormality.  Paraspinal musculature is unremarkable. Nonspecific stranding in the subcutaneous fat intact.  IMPRESSION:  Stable burst type compression fracture L1 with mild retropulsion of bone into the epidural space and minimal associated canal stenosis.

## 2020-08-06 NOTE — PROGRESS NOTE ADULT - ASSESSMENT
19 year old woman, no significant PMH, s/p fall, + L1 burst type compression fracture    PLAN-  Bedrest with bathroom privileges  Pt should wear brace at all times when sitting up greater than 30 degrees.   Custom hard clamshell brace ordered from [facility]   Pain meds as needed  Zofran for nausea  Senna and Miralax for constipation   SQ Lovenox for DVT PPX  Dr. Corley to call patient's mother today     Discussed with Dr. Corley who has reviewed all imaging and agrees with plan

## 2020-08-07 ENCOUNTER — TRANSCRIPTION ENCOUNTER (OUTPATIENT)
Age: 20
End: 2020-08-07

## 2020-08-07 VITALS
RESPIRATION RATE: 16 BRPM | DIASTOLIC BLOOD PRESSURE: 73 MMHG | OXYGEN SATURATION: 100 % | HEART RATE: 74 BPM | TEMPERATURE: 98 F | SYSTOLIC BLOOD PRESSURE: 109 MMHG

## 2020-08-07 PROCEDURE — 99238 HOSP IP/OBS DSCHRG MGMT 30/<: CPT

## 2020-08-07 RX ORDER — ACETAMINOPHEN 500 MG
2 TABLET ORAL
Qty: 0 | Refills: 0 | DISCHARGE
Start: 2020-08-07

## 2020-08-07 RX ORDER — POLYETHYLENE GLYCOL 3350 17 G/17G
17 POWDER, FOR SOLUTION ORAL
Qty: 0 | Refills: 0 | DISCHARGE
Start: 2020-08-07

## 2020-08-07 RX ORDER — IBUPROFEN 200 MG
3 TABLET ORAL
Qty: 0 | Refills: 0 | DISCHARGE

## 2020-08-07 RX ORDER — SENNA PLUS 8.6 MG/1
2 TABLET ORAL
Qty: 0 | Refills: 0 | DISCHARGE
Start: 2020-08-07

## 2020-08-07 RX ADMIN — SODIUM CHLORIDE 75 MILLILITER(S): 9 INJECTION INTRAMUSCULAR; INTRAVENOUS; SUBCUTANEOUS at 03:51

## 2020-08-07 RX ADMIN — ENOXAPARIN SODIUM 40 MILLIGRAM(S): 100 INJECTION SUBCUTANEOUS at 09:37

## 2020-08-07 RX ADMIN — Medication 15 MILLIGRAM(S): at 09:36

## 2020-08-07 RX ADMIN — HYDROMORPHONE HYDROCHLORIDE 0.5 MILLIGRAM(S): 2 INJECTION INTRAMUSCULAR; INTRAVENOUS; SUBCUTANEOUS at 09:26

## 2020-08-07 NOTE — DISCHARGE NOTE PROVIDER - HOSPITAL COURSE
18 yo female PMH IUD presents to ER s/p fall. Pt was roller blading and fell backwards onto her buttocks and had immediate onset of low back pain, difficulty getting up due to pain.  + vomiting at time of incident. CT sig for L1 fracture (2 column injury). Placed on bedrest, pain meds adjusted. CT Lspine with no acute changes. MRI of Lspine Following day. Custom TLSO clamshell / hard plastic brace fitted. Clear for discharge home with close outpatient fiollow up

## 2020-08-07 NOTE — DISCHARGE NOTE NURSING/CASE MANAGEMENT/SOCIAL WORK - PATIENT PORTAL LINK FT
You can access the FollowMyHealth Patient Portal offered by [facility] by registering at the following website: [email or web address]. By joining [facility]’s FollowMyHealth portal, you will also be able to view your health information using other applications (apps) compatible with our system.

## 2020-08-07 NOTE — DISCHARGE NOTE PROVIDER - NSDCACTIVITY_GEN_ALL_CORE
Showering allowed/Do not drive or operate machinery/No heavy lifting/straining/Walking - Outdoors allowed

## 2020-08-07 NOTE — PHYSICAL THERAPY INITIAL EVALUATION ADULT - PREDICTED DURATION OF THERAPY (DAYS/WKS), PT EVAL
No Acute care PT intervention at this time. No HPT needs at this time. PT is functional in the TLSO.

## 2020-08-07 NOTE — DISCHARGE NOTE PROVIDER - NSDCMRMEDTOKEN_GEN_ALL_CORE_FT
acetaminophen 325 mg oral tablet: 2 tab(s) orally every 4 hours, As needed, Temp greater or equal to 38C (100.4F), Mild Pain (1 - 3)  ibuprofen 200 mg oral tablet: 3 tab(s) orally every 8 hours, As Needed  ondansetron 4 mg oral tablet, disintegratin tab(s) orally every 8 hours, As Needed for nausea  oxycodone-acetaminophen 5 mg-325 mg oral tablet: 1 tab(s) orally every 6 hours, As Needed for severe pain MDD:4  polyethylene glycol 3350 oral powder for reconstitution: 17 gram(s) orally 2 times a day, As needed, Constipation  senna oral tablet: 2 tab(s) orally once a day (at bedtime), As needed, Constipation

## 2020-08-07 NOTE — PROGRESS NOTE ADULT - ASSESSMENT
19 year old woman, no significant PMH, s/p fall, + L1 burst type compression fracture    PLAN-  Bedrest with bathroom privileges  Pt should wear brace at all times when sitting up greater than 30 degrees.   Custom hard clamshell brace measurements taken from [facility] yesterday  Pending Clamshell Brace  Pain meds as needed  Zofran for nausea  Senna and Miralax for constipation   SQ Lovenox for DVT PPX    Discussed with Dr. Corley

## 2020-08-07 NOTE — PHYSICAL THERAPY INITIAL EVALUATION ADULT - PERTINENT HX OF CURRENT PROBLEM, REHAB EVAL
19 yoF PMHx IUD presents to ED s/p fall approximately 45 minutes PTA. Pt was roller blading and fell backwards onto her buttocks. Pt had immediate onset of low back pain 19 yoF PMHx IUD presents to ED s/p fall approximately 45 minutes PTA. Pt was roller skating and fell backwards onto her buttocks. Pt had immediate onset of low back pain

## 2020-08-07 NOTE — PHYSICAL THERAPY INITIAL EVALUATION ADULT - DIAGNOSIS, PT EVAL
L1 2 column fracture unstable spine, MRI- Acute burst type compression fracture of L1 is redemonstrated with mild retropulsion of bone into the epidural space, Mild to moderate canal stenosis at L1. No cauda equina compression.

## 2020-08-07 NOTE — PROGRESS NOTE ADULT - SUBJECTIVE AND OBJECTIVE BOX
HPI:  19 yoF PMHx IUD presents to ED s/p fall approximately 45 minutes PTA. Pt was roller blading and fell backwards onto her buttocks. Pt had immediate onset of low back pain, says she had difficulty getting up and driving to ED due to pain. Denies blunt trauma to abd. Denies possibility of pregnancy.  Pt with severe pain, + vomitting at time of incident.  Pt with L1 fracture (2 column injury).  Pt on bedrest, ok with assist to the bathroom.  Pt needs CT Lspine now and MRI of Lspine tomorrow 8/6/20.  Pt returns to ED for further evaluation / management / potential treatment.  Ok to admit to Dr. Corley. Pt needs TLSO clamshell / hard plastic bracing solution.    8/6- Pt seen and examined, resting comfortably in bed, pain better controlled but does have nausea from the pain meds, CT/MRI reviewed, awaiting custom clamshell brace from [facility], explained to pt she should wear a brace at all time if she is over 30 degrees, bedrest with bathroom privileges. Denies difficulty with urination, no numbness down her legs, strength intact.  Pain is in lower back sharp/stabbing in nature, non-radiating, improves with bedrest and pain meds.     8/7- Patient seen and examined, no acute events overnight. Reports back pain improved this AM, has no needed pain medication since last night. Measurements taken for Clamshell brace yesterday. She denies any numbness/tingling, weakness of the legs or urinary/bowel incontinence     Vital Signs Last 24 Hrs  T(C): 36.8 (06 Aug 2020 20:30), Max: 36.8 (06 Aug 2020 20:30)  T(F): 98.2 (06 Aug 2020 20:30), Max: 98.2 (06 Aug 2020 20:30)  HR: 73 (06 Aug 2020 20:30) (64 - 73)  BP: 133/50 (06 Aug 2020 20:30) (97/54 - 133/50)  BP(mean): --  RR: 17 (06 Aug 2020 20:30) (17 - 18)  SpO2: 98% (06 Aug 2020 20:30) (98% - 99%)    MEDICATIONS  (STANDING):  enoxaparin Injectable 40 milliGRAM(s) SubCutaneous daily  famotidine    Tablet 20 milliGRAM(s) Oral every 12 hours  multivitamin 1 Tablet(s) Oral daily  sodium chloride 0.9%. 1000 milliLiter(s) (75 mL/Hr) IV Continuous <Continuous>    MEDICATIONS  (PRN):  acetaminophen   Tablet .. 650 milliGRAM(s) Oral every 4 hours PRN Temp greater or equal to 38C (100.4F), Mild Pain (1 - 3)  HYDROmorphone  Injectable 0.5 milliGRAM(s) SubCutaneous every 4 hours PRN Severe Pain (7 - 10)  ketorolac   Injectable 15 milliGRAM(s) IV Push every 6 hours PRN Moderate Pain (4 - 6)  ondansetron Injectable 4 milliGRAM(s) IV Push every 6 hours PRN Nausea and/or Vomiting  polyethylene glycol 3350 17 Gram(s) Oral two times a day PRN Constipation  senna 2 Tablet(s) Oral at bedtime PRN Constipation      PHYSICAL EXAM:  Constitutional: Awake / alert, NAD   HEENT: PERRLA, EOMI  Neck: Supple  Respiratory: Breath sounds are clear bilaterally  Cardiovascular: S1 and S2, regular rhythm  Gastrointestinal: Soft, NT/ND  Extremities:  no edema  Vascular: No carotid Bruit  Musculoskeletal: + mild TTP at L1 region   Skin: No rashes    Neurological Exam:  HF: A x O x 3, appropriately interactive, normal affect, speech fluent, no aphasia or paraphasic errors. Naming /repetition intact   CN: PERRL, EOMI, VFF, facial sensation normal, no NLFD, tongue midline  Motor: No pronator drift, Strength 5/5 in all 4 ext, normal bulk and tone, no tremor, rigidity or bradykinesia  Sens: Intact to light touch  Reflexes: Symmetric and normal, downgoing toes b/l  Coord:  No FNFA, dysmetria, JAHAIRA intact   Gait/Balance: Cannot test, bed rest         LABS:                         12.0   6.82  )-----------( 321      ( 06 Aug 2020 08:18 )             38.4     08-06    139  |  106  |  6<L>  ----------------------------<  86  3.6   |  27  |  0.63    Ca    8.4<L>      06 Aug 2020 08:18    TPro  7.0  /  Alb  3.4  /  TBili  0.5  /  DBili  x   /  AST  14<L>  /  ALT  14  /  AlkPhos  60  08-06    LIVER FUNCTIONS - ( 06 Aug 2020 08:18 )  Alb: 3.4 g/dL / Pro: 7.0 gm/dL / ALK PHOS: 60 U/L / ALT: 14 U/L / AST: 14 U/L / GGT: x

## 2020-08-07 NOTE — PROGRESS NOTE ADULT - SUBJECTIVE AND OBJECTIVE BOX
I fit Dane with the custom TLSO as ordered. She was able to pratibha and doff the brace independently. Two body socks were also provided. Will follow as needed.    [facility]

## 2020-08-07 NOTE — PHYSICAL THERAPY INITIAL EVALUATION ADULT - PRECAUTIONS/LIMITATIONS, REHAB EVAL
spinal precautions/Pt needs TLSO clamb shell hard brace for oob with PT - full weight bearing with TLSO clampshell brace.  Pt is ok for oob to bath with LSO corsette and assistance. Pt should wear brace at all times when sitting up greater than 30 degrees./fall precautions fall precautions/Pt needs TLSO clamb shell hard brace for oob with PT - full weight bearing with TLSO clampshell brace.  Pt should wear brace at all times when sitting up greater than 30 degrees./spinal precautions

## 2020-08-07 NOTE — DISCHARGE NOTE PROVIDER - NSDCCPCAREPLAN_GEN_ALL_CORE_FT
PRINCIPAL DISCHARGE DIAGNOSIS  Diagnosis: Closed fracture of lumbar vertebra  Assessment and Plan of Treatment:       SECONDARY DISCHARGE DIAGNOSES  Diagnosis: IUD (intrauterine device) in place  Assessment and Plan of Treatment: IUD (intrauterine device) in place

## 2020-08-07 NOTE — DISCHARGE NOTE PROVIDER - CARE PROVIDER_API CALL
Venkat Corley  NEUROSURGERY  [address] [place] [address]  Phone: [phone]  Fax: [phone]  Established Patient  Follow Up Time: 2 weeks

## 2020-08-07 NOTE — PHYSICAL THERAPY INITIAL EVALUATION ADULT - MODALITIES TREATMENT COMMENTS
Pt sitting OOB in chair w/ Orthotist consult continuing for strap length adjustments. No further Acute Care PT needs at this time. MEL, RN aware. Pt looking forward to being discharged.

## 2020-08-07 NOTE — PHYSICAL THERAPY INITIAL EVALUATION ADULT - REFERRING PHYSICIAN, REHAB EVAL
Dr. Burnham, Venkat PEREZ- Pt needs TLSO clamb shell hard brace for oob with PT - full weight bearing with TLSO clampshell brace.  Pt is ok for oob to bath with LSO corsette and assistance Dr. Burnham, Venkat J- Pt needs TLSO clamb shell hard brace for oob with PT - full weight bearing with TLSO clampshell brace.

## 2020-08-12 DIAGNOSIS — S32.011A STABLE BURST FRACTURE OF FIRST LUMBAR VERTEBRA, INITIAL ENCOUNTER FOR CLOSED FRACTURE: ICD-10-CM
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
## 2020-08-12 DIAGNOSIS — Z97.5 PRESENCE OF (INTRAUTERINE) CONTRACEPTIVE DEVICE: ICD-10-CM
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
## 2020-08-14 ENCOUNTER — OUTPATIENT (OUTPATIENT)
Dept: OUTPATIENT SERVICES | Facility: HOSPITAL | Age: 20
LOS: 1 days | End: 2020-08-14
Payer: COMMERCIAL

## 2020-08-14 ENCOUNTER — APPOINTMENT (OUTPATIENT)
Dept: RADIOLOGY | Facility: CLINIC | Age: 20
End: 2020-08-14
Payer: COMMERCIAL

## 2020-08-14 ENCOUNTER — APPOINTMENT (OUTPATIENT)
Dept: NEUROSURGERY | Facility: CLINIC | Age: 20
End: 2020-08-14
Payer: COMMERCIAL

## 2020-08-14 VITALS
HEIGHT: 66.89 IN | WEIGHT: 192 LBS | OXYGEN SATURATION: 97 % | SYSTOLIC BLOOD PRESSURE: 116 MMHG | TEMPERATURE: 98.7 F | DIASTOLIC BLOOD PRESSURE: 73 MMHG | HEART RATE: 93 BPM | BODY MASS INDEX: 30.13 KG/M2

## 2020-08-14 VITALS
HEIGHT: 66 IN | BODY MASS INDEX: 30.46 KG/M2 | HEART RATE: 88 BPM | TEMPERATURE: 98.2 F | DIASTOLIC BLOOD PRESSURE: 71 MMHG | WEIGHT: 189.5 LBS | RESPIRATION RATE: 97 BRPM | SYSTOLIC BLOOD PRESSURE: 103 MMHG

## 2020-08-14 DIAGNOSIS — S32.000A WEDGE COMPRESSION FRACTURE OF UNSPECIFIED LUMBAR VERTEBRA, INITIAL ENCOUNTER FOR CLOSED FRACTURE: ICD-10-CM

## 2020-08-14 DIAGNOSIS — Z78.9 OTHER SPECIFIED HEALTH STATUS: ICD-10-CM

## 2020-08-14 DIAGNOSIS — M54.5 LOW BACK PAIN: ICD-10-CM

## 2020-08-14 PROCEDURE — 72100 X-RAY EXAM L-S SPINE 2/3 VWS: CPT

## 2020-08-14 PROCEDURE — 99214 OFFICE O/P EST MOD 30 MIN: CPT

## 2020-08-14 PROCEDURE — 72100 X-RAY EXAM L-S SPINE 2/3 VWS: CPT | Mod: 26

## 2020-08-14 NOTE — CONSULT LETTER
[Dear  ___] : Dear  [unfilled], [Courtesy Letter:] : I had the pleasure of seeing your patient, [unfilled], in my office today. [Sincerely,] : Sincerely, [FreeTextEntry2] : Steven Brunner,MD\par [address]\[place] [address] [FreeTextEntry1] : Dane Vaughn is a 19-year-old female who presents today for follow-up for her L1 fracture.  On August 2, 2020 patient sustained a fall while rollerskating.  Patient had presented to [facility] emergency room with severe lower back pain.  A CT scan abdomen was performed which indicated acute compression fracture of L1 vertebral body.  Patient was discharged with Percocet and Zofran.  Patient presents today for follow-up.  Patient is noted not to have a lumbar brace.  Patient states her lower back pain is a 8/10 with standing and moving.  Pain decreases to 5/10 with lying down.  She describes the pain as sharp and stabbing.  She denies any radiating leg pain.  She denies any numbness, tingling, or weakness to her lower extremities.  She denies any walking difficulties.  She denies any bowel or bladder incontinence.  She denies any saddle anesthesia.\par \par Reviewed CT of the abdomen performed on 8/2/20 with patient and mom which indicates L1 compression fracture with retropulsion. \par \par  Patient is alert and oriented.  No distress noted.  Strength to bilateral upper and lower extremities 5/5.  Present and equal bilateral upper and lower extremity reflexes.  Negative Fan.  Negative clonus.  Patient is walking without difficulties.  Sensation to light touch to bilateral upper and lower extremities equal and normal.  Sensation to temperature to bilateral feet and arms and hands equal and normal.\par \par I provided the patient with a TLSO brace for support.  Patient advised to go to the emergency room for further evaluation and management of her L1 fracture.  Patient and mother in agreement.  Dr. Corley in to speak with patient. [FreeTextEntry3] : Ruth Willoughby, MSN, FNP-BC\par Nurse Practitioner\par Department of Neurosurgery\par \par [facility] [place]\par [address],\[place] [address]\par \par Office: [phone]\par Fax: [phone]\par \par \par \par \par

## 2020-08-17 NOTE — CONSULT LETTER
[Dear  ___] : Dear  [unfilled], [Courtesy Letter:] : I had the pleasure of seeing your patient, [unfilled], in my office today. [Sincerely,] : Sincerely, [FreeTextEntry2] : Steven Brunner,MD\par [address]\par [place] [address] \par \par  [FreeTextEntry1] : Dane Vaughn is a 19-year-old female who presents today for follow-up for her L1 fracture. On August 2, 2020 patient sustained a fall while rollerskating. Patient had presented to [facility] emergency room with severe lower back pain. A CT scan abdomen was performed which indicated acute compression fracture of L1 vertebral body.  Patient was patient was last seen in our office on August 5, 2020.  At that time the patient was advised to go back to the hospital.  Patient was put in a clamshell brace and was treated conservatively.  Patient returns today for follow-up.  Patient presents in her clamshell brace which she states she has been wearing at all times except at night when sleeping.  She denies any back pain.  She denies any numbness, tingling, or weakness.  She has been avoiding any heavy lifting.\par \par Reviewed x-ray of the lumbar spine performed on 8/14/20 with patient and mom which indicates stable L1 compression fracture.\par \par  Patient is alert and oriented. No distress noted. Strength to bilateral upper and lower extremities 5/5. Present and equal bilateral upper and lower extremity reflexes. Negative Fan. Negative clonus. Patient is walking without difficulties. Sensation to light touch to bilateral upper and lower extremities equal and normal.  Palpation to the lumbar spine does not elicit any pain.\par \par Patient is recovering well.  Patient  is advised to continue to wear her clamshell brace at all times except when in bed.  I have advised the patient to avoid any pool activities at this time.  We will follow-up in 2 weeks with an updated x-ray of the lumbar spine to evaluate for progression.  Patient is aware to call at anytime with any further questions or concerns. [FreeTextEntry3] : Ruth Willoughby, MSN, FNP-BC\par Nurse Practitioner\par Neurosurgery\par [address] [place] [address], [address]nd floor \[place] [address] \par Office: [phone] \par Fax: [phone]\par \par

## 2020-08-28 ENCOUNTER — APPOINTMENT (OUTPATIENT)
Dept: NEUROSURGERY | Facility: CLINIC | Age: 20
End: 2020-08-28
Payer: COMMERCIAL

## 2020-08-28 ENCOUNTER — OUTPATIENT (OUTPATIENT)
Dept: OUTPATIENT SERVICES | Facility: HOSPITAL | Age: 20
LOS: 1 days | End: 2020-08-28
Payer: COMMERCIAL

## 2020-08-28 ENCOUNTER — RESULT REVIEW (OUTPATIENT)
Age: 20
End: 2020-08-28

## 2020-08-28 ENCOUNTER — APPOINTMENT (OUTPATIENT)
Dept: RADIOLOGY | Facility: CLINIC | Age: 20
End: 2020-08-28
Payer: COMMERCIAL

## 2020-08-28 VITALS
WEIGHT: 198 LBS | BODY MASS INDEX: 30.36 KG/M2 | HEART RATE: 93 BPM | RESPIRATION RATE: 16 BRPM | HEIGHT: 67.83 IN | DIASTOLIC BLOOD PRESSURE: 83 MMHG | SYSTOLIC BLOOD PRESSURE: 129 MMHG | OXYGEN SATURATION: 98 %

## 2020-08-28 DIAGNOSIS — S32.000A WEDGE COMPRESSION FRACTURE OF UNSPECIFIED LUMBAR VERTEBRA, INITIAL ENCOUNTER FOR CLOSED FRACTURE: ICD-10-CM

## 2020-08-28 PROCEDURE — 72100 X-RAY EXAM L-S SPINE 2/3 VWS: CPT

## 2020-08-28 PROCEDURE — 72100 X-RAY EXAM L-S SPINE 2/3 VWS: CPT | Mod: 26

## 2020-08-28 PROCEDURE — 99213 OFFICE O/P EST LOW 20 MIN: CPT

## 2020-08-28 NOTE — CONSULT LETTER
[Sincerely,] : Sincerely, [Dear  ___] : Dear  [unfilled], [Courtesy Letter:] : I had the pleasure of seeing your patient, [unfilled], in my office today. [FreeTextEntry2] : Steven Brunner,MD\par [address]\par [place] [address] \par  [FreeTextEntry1] :  Dane Vaughn is a 19-year-old female who presents today for follow-up for her L1 fracture. On August 2, 2020 patient sustained a fall while rollerskating. Patient had presented to [facility] emergency room with severe lower back pain. A CT scan abdomen was performed which indicated acute compression fracture of L1 vertebral body. Patient was patient was seen in our office on August 5, 2020. At that time the patient was advised to go back to the hospital. Patient was put in a clamshell brace and was treated conservatively. \par \par Patient returns today for follow-up. Patient presents in her clamshell brace which she states she has been wearing at all times except at night when sleeping. She denies any sharp back pain however she reports some spasming in her upper back region improved with muscle relaxers and Tylenol. She denies any numbness, tingling, or weakness.  \par \par Reviewed x-ray of the lumbar spine performed on 8/28/20 with patient which indicates stable L1 compression fracture.\par \par  Patient is alert and oriented. No distress noted. Strength to bilateral upper and lower extremities 5/5. Present and equal bilateral upper and lower extremity reflexes. Negative Fan. Negative clonus. Patient is walking without difficulties. Palpation to the lumbar spine does not elicit any pain.\par \par Patient continues to recover well. Patient is advised to continue to wear her clamshell brace at all times except when in bed. We will follow-up in 4 weeks with an updated Ct of the lumbar spine to evaluate for progression. Patient is aware to call at anytime with any further questions or concerns.  [FreeTextEntry3] : Ruth Willoughby, MSN, FNP-BC\par Nurse Practitioner\par Department of Neurosurgery\par \par [facility] [place]\par [address],\[place] [address]\par \par Office: [phone]\par Fax: [phone]\par \par

## 2020-10-05 ENCOUNTER — RESULT REVIEW (OUTPATIENT)
Age: 20
End: 2020-10-05

## 2020-10-05 ENCOUNTER — APPOINTMENT (OUTPATIENT)
Dept: CT IMAGING | Facility: CLINIC | Age: 20
End: 2020-10-05
Payer: COMMERCIAL

## 2020-10-05 ENCOUNTER — OUTPATIENT (OUTPATIENT)
Dept: OUTPATIENT SERVICES | Facility: HOSPITAL | Age: 20
LOS: 1 days | End: 2020-10-05
Payer: COMMERCIAL

## 2020-10-05 DIAGNOSIS — Z00.8 ENCOUNTER FOR OTHER GENERAL EXAMINATION: ICD-10-CM

## 2020-10-05 PROCEDURE — 72131 CT LUMBAR SPINE W/O DYE: CPT | Mod: 26

## 2020-10-05 PROCEDURE — 72131 CT LUMBAR SPINE W/O DYE: CPT

## 2020-10-05 PROCEDURE — 76376 3D RENDER W/INTRP POSTPROCES: CPT

## 2020-10-05 PROCEDURE — 76376 3D RENDER W/INTRP POSTPROCES: CPT | Mod: 26

## 2020-10-07 ENCOUNTER — APPOINTMENT (OUTPATIENT)
Dept: NEUROSURGERY | Facility: CLINIC | Age: 20
End: 2020-10-07
Payer: COMMERCIAL

## 2020-10-07 VITALS
SYSTOLIC BLOOD PRESSURE: 126 MMHG | BODY MASS INDEX: 31.08 KG/M2 | HEIGHT: 67 IN | TEMPERATURE: 96.9 F | WEIGHT: 198 LBS | DIASTOLIC BLOOD PRESSURE: 83 MMHG | OXYGEN SATURATION: 100 % | HEART RATE: 100 BPM

## 2020-10-07 PROCEDURE — 99213 OFFICE O/P EST LOW 20 MIN: CPT

## 2020-10-07 RX ORDER — IBUPROFEN 200 MG/1
TABLET, COATED ORAL
Refills: 0 | Status: DISCONTINUED | COMMUNITY
End: 2020-10-07

## 2020-10-07 RX ORDER — OXYCODONE HYDROCHLORIDE AND ACETAMINOPHEN 10; 325 MG/1; MG/1
TABLET ORAL
Refills: 0 | Status: DISCONTINUED | COMMUNITY
End: 2020-10-07

## 2020-10-09 NOTE — CONSULT LETTER
[Dear  ___] : Dear  [unfilled], [Courtesy Letter:] : I had the pleasure of seeing your patient, [unfilled], in my office today. [Sincerely,] : Sincerely, [FreeTextEntry2] : Steven Brunner,MD\par [address]\[place] [address]  [FreeTextEntry1] :  Dane Vaughn is a 19-year-old female who presents today for follow-up for her L1 fracture. On August 2, 2020 patient sustained a fall while rollerskating. Patient had presented to [facility] emergency room with severe lower back pain. A CT scan abdomen was performed which indicated acute compression fracture of L1 vertebral body. Patient was patient was seen in our office on August 5, 2020. At that time the patient was advised to go back to the hospital. Patient was put in a clamshell brace and was treated conservatively. \par \par Patient returns today for follow-up. Patient presents in her clamshell brace which she states she has been wearing at all times except at night when sleeping or when showering. She denies any sharp back pain however she reports occasional spasming in the thoracic region which resolves when brace is off at night. She denies any numbness, tingling, or weakness. She denies any bowel or bladder dysfunction. She denies any difficulties with walking. \par \par Patient presents with a Ct of the lumbar spine performed on 10/5/20 which indicates stable compression fracture of the L1 vertebral body when compared to previous imaging.\par \par  Patient is alert and oriented. No distress noted. Strength to bilateral upper and lower extremities 5/5. Present and equal bilateral upper and lower extremity reflexes. Negative Fan. Negative clonus. Patient is walking without difficulties. Palpation to the lumbar spine does not elicit any pain.\par \par Patient continues to recover well. Dr. Corley will be in contact with patient regarding imaging and next steps.  Patient is aware to call at anytime with any further questions or concerns. \par \par Addendum 10/8/2020: Dr. Corley spoke with patient regarding CT results.  CT  stable.  Patient requesting to discontinue clamshell brace.  Patient was educated on the importance of wearing the clamshell brace at all times when out of bed for minimum of 10 weeks from injury.  At the 10-week ariana from injury patient may remove her brace.  Patient can engage in gentle walking.  Patient was advised to avoid any impact sports.  We will follow-up in office at 12 weeks from injury with a flexion-extension x-ray of the lumbar spine.  Patient verbalizes understanding.  All questions were answered.\par  [FreeTextEntry3] : Ruth Willoughby, MSN, FNP-BC\par Nurse Practitioner\par Neurosurgery\par [address] [place] [address], 2nd floor \[place] [address] \par Office: [phone] \par Fax: [phone]\par \par

## 2020-10-28 ENCOUNTER — APPOINTMENT (OUTPATIENT)
Dept: NEUROSURGERY | Facility: CLINIC | Age: 20
End: 2020-10-28
Payer: COMMERCIAL

## 2020-10-28 ENCOUNTER — OUTPATIENT (OUTPATIENT)
Dept: OUTPATIENT SERVICES | Facility: HOSPITAL | Age: 20
LOS: 1 days | End: 2020-10-28
Payer: COMMERCIAL

## 2020-10-28 ENCOUNTER — APPOINTMENT (OUTPATIENT)
Dept: RADIOLOGY | Facility: CLINIC | Age: 20
End: 2020-10-28
Payer: COMMERCIAL

## 2020-10-28 ENCOUNTER — RESULT REVIEW (OUTPATIENT)
Age: 20
End: 2020-10-28

## 2020-10-28 VITALS
DIASTOLIC BLOOD PRESSURE: 77 MMHG | OXYGEN SATURATION: 99 % | SYSTOLIC BLOOD PRESSURE: 120 MMHG | HEIGHT: 67 IN | HEART RATE: 91 BPM | WEIGHT: 188 LBS | TEMPERATURE: 97 F | BODY MASS INDEX: 29.51 KG/M2

## 2020-10-28 DIAGNOSIS — S32.000A WEDGE COMPRESSION FRACTURE OF UNSPECIFIED LUMBAR VERTEBRA, INITIAL ENCOUNTER FOR CLOSED FRACTURE: ICD-10-CM

## 2020-10-28 DIAGNOSIS — Z00.8 ENCOUNTER FOR OTHER GENERAL EXAMINATION: ICD-10-CM

## 2020-10-28 PROCEDURE — 72110 X-RAY EXAM L-2 SPINE 4/>VWS: CPT

## 2020-10-28 PROCEDURE — 72110 X-RAY EXAM L-2 SPINE 4/>VWS: CPT | Mod: 26

## 2020-10-28 PROCEDURE — 99072 ADDL SUPL MATRL&STAF TM PHE: CPT

## 2020-10-28 PROCEDURE — 99213 OFFICE O/P EST LOW 20 MIN: CPT

## 2020-10-28 NOTE — CONSULT LETTER
[Dear  ___] : Dear  [unfilled], [Courtesy Letter:] : I had the pleasure of seeing your patient, [unfilled], in my office today. [Sincerely,] : Sincerely, [FreeTextEntry2] : Steven Brunner,MD\par [address]\par [place] [address] \par  [FreeTextEntry1] : Dane Vaughn is a 19-year-old female who presents today for follow-up for her L1 fracture. On August 2, 2020 patient sustained a fall while rollerskating. Patient had presented to [facility] emergency room with severe lower back pain. A CT scan abdomen was performed which indicated acute compression fracture of L1 vertebral body. Patient was patient was seen in our office on August 5, 2020. At that time the patient was advised to go back to the hospital. Patient was put in a clamshell brace and was treated conservatively. \par \par Patient returns today for a 12-week follow-up appointment from her injury.  Patient had removed her clamshell brace 10 weeks out from injury which was approximately 2 weeks ago.  She denies any back pain specifically sharp pain.  She denies any numbness tingling or weakness to her upper or lower extremities.  She denies any difficulties with walking.\par \par Patient presents with a x-ray of the lumbar spine performed on 10/28/20 which indicates stable compression fracture of the L1 vertebral body.\par \par  Patient is alert and oriented. No distress noted. Strength to bilateral upper and lower extremities 5/5. Present and equal bilateral upper and lower extremity reflexes. Negative Fan. Negative clonus. Patient is walking without difficulties. Palpation to the lumbar spine does not elicit any pain.\par \par I have provided the patient with a referral for PT .  I advised the patient to ease back into normal activity.  She should avoid any activity that could potentially cause a fall. We will follow-up as needed. Patient is aware to call at anytime with any further questions or concerns.  [FreeTextEntry3] : Ruth Willoughby, MSN, FNP-BC\par Nurse Practitioner\par Department of Neurosurgery\par \par [facility] [place]\par [address],\[place] [address]\par \par Office: [phone]\par Fax: [phone]\par \par

## 2020-10-29 RX ORDER — METHYLPHENIDATE HYDROCHLORIDE 54 MG/1
54 TABLET, EXTENDED RELEASE ORAL
Qty: 30 | Refills: 0 | Status: ACTIVE | COMMUNITY
Start: 2020-10-14

## 2020-10-29 RX ORDER — ONDANSETRON 4 MG/1
4 TABLET, ORALLY DISINTEGRATING ORAL
Qty: 12 | Refills: 0 | Status: ACTIVE | COMMUNITY
Start: 2020-08-02

## 2020-10-29 RX ORDER — OXYCODONE AND ACETAMINOPHEN 5; 325 MG/1; MG/1
5-325 TABLET ORAL
Qty: 12 | Refills: 0 | Status: ACTIVE | COMMUNITY
Start: 2020-08-02

## 2024-11-09 NOTE — PROGRESS NOTE ADULT - REASON FOR ADMISSION
L1 fracture (2 column injury)
Abdomen soft, tender on palpation of the lower abdomen, no guarding.